# Patient Record
Sex: FEMALE | Race: WHITE | NOT HISPANIC OR LATINO | Employment: FULL TIME | URBAN - METROPOLITAN AREA
[De-identification: names, ages, dates, MRNs, and addresses within clinical notes are randomized per-mention and may not be internally consistent; named-entity substitution may affect disease eponyms.]

---

## 2018-01-29 ENCOUNTER — TRANSCRIBE ORDERS (OUTPATIENT)
Dept: ADMINISTRATIVE | Facility: HOSPITAL | Age: 48
End: 2018-01-29

## 2018-01-29 DIAGNOSIS — R10.9 STOMACH PAIN: Primary | ICD-10-CM

## 2018-02-02 ENCOUNTER — HOSPITAL ENCOUNTER (OUTPATIENT)
Dept: ULTRASOUND IMAGING | Facility: HOSPITAL | Age: 48
Discharge: HOME | End: 2018-02-02
Admitting: INTERNAL MEDICINE

## 2018-02-02 DIAGNOSIS — R10.9 STOMACH PAIN: ICD-10-CM

## 2018-02-02 PROCEDURE — 76700 US EXAM ABDOM COMPLETE: CPT

## 2018-02-09 ENCOUNTER — TRANSCRIBE ORDERS (OUTPATIENT)
Dept: ADMINISTRATIVE | Facility: HOSPITAL | Age: 48
End: 2018-02-09

## 2018-02-09 DIAGNOSIS — K76.89 LIVER NODULE: Primary | ICD-10-CM

## 2018-02-09 DIAGNOSIS — R10.9 RIGHT SIDED ABDOMINAL PAIN: Primary | ICD-10-CM

## 2018-02-14 ENCOUNTER — HOSPITAL ENCOUNTER (OUTPATIENT)
Dept: NUCLEAR MEDICINE | Facility: HOSPITAL | Age: 48
Discharge: HOME | End: 2018-02-14

## 2018-02-14 DIAGNOSIS — R10.9 RIGHT SIDED ABDOMINAL PAIN: ICD-10-CM

## 2018-02-14 PROCEDURE — 78227 HEPATOBIL SYST IMAGE W/DRUG: CPT

## 2018-02-14 PROCEDURE — A9537 TC99M MEBROFENIN: HCPCS | Performed by: INTERNAL MEDICINE

## 2018-02-14 PROCEDURE — 25010000002 SINCALIDE PER 5 MCG: Performed by: INTERNAL MEDICINE

## 2018-02-14 PROCEDURE — 0 TECHNETIUM TC 99M MEBROFENIN KIT: Performed by: INTERNAL MEDICINE

## 2018-02-14 RX ORDER — KIT FOR THE PREPARATION OF TECHNETIUM TC 99M MEBROFENIN 45 MG/10ML
1 INJECTION, POWDER, LYOPHILIZED, FOR SOLUTION INTRAVENOUS
Status: COMPLETED | OUTPATIENT
Start: 2018-02-14 | End: 2018-02-14

## 2018-02-14 RX ADMIN — SINCALIDE 1.1 MCG: 5 INJECTION, POWDER, LYOPHILIZED, FOR SOLUTION INTRAVENOUS at 10:19

## 2018-02-14 RX ADMIN — MEBROFENIN 1 DOSE: 45 INJECTION, POWDER, LYOPHILIZED, FOR SOLUTION INTRAVENOUS at 09:20

## 2018-02-20 RX ORDER — ALPRAZOLAM 0.25 MG/1
0.25 TABLET ORAL NIGHTLY PRN
COMMUNITY
Start: 2017-11-29 | End: 2020-05-26

## 2018-02-22 ENCOUNTER — OFFICE VISIT (OUTPATIENT)
Dept: SURGERY | Facility: CLINIC | Age: 48
End: 2018-02-22

## 2018-02-22 VITALS — WEIGHT: 118 LBS | OXYGEN SATURATION: 99 % | HEART RATE: 78 BPM | HEIGHT: 60 IN | BODY MASS INDEX: 23.16 KG/M2

## 2018-02-22 DIAGNOSIS — R10.11 RUQ PAIN: Primary | ICD-10-CM

## 2018-02-22 DIAGNOSIS — K82.8 BILIARY DYSKINESIA: ICD-10-CM

## 2018-02-22 PROCEDURE — 99243 OFF/OP CNSLTJ NEW/EST LOW 30: CPT | Performed by: SURGERY

## 2018-02-22 RX ORDER — CHOLECALCIFEROL (VITAMIN D3) 125 MCG
5 CAPSULE ORAL NIGHTLY
COMMUNITY

## 2018-02-22 RX ORDER — LEVOTHYROXINE AND LIOTHYRONINE 19; 4.5 UG/1; UG/1
30 TABLET ORAL DAILY
COMMUNITY
End: 2022-12-09

## 2018-02-22 RX ORDER — ERGOCALCIFEROL (VITAMIN D2) 10 MCG
400 TABLET ORAL DAILY
COMMUNITY
End: 2022-12-09

## 2018-02-22 NOTE — PROGRESS NOTES
SUMMARY (A/P):    47-year-old lady with typical symptoms of biliary colic and abnormal HIDA scan.  We discussed options of observation versus laparoscopic cholecystectomy.  In terms of the surgery I described the rationale for the procedure, the nature of the procedure, and the risks including but not limited to bleeding, infection, conversion to open procedure, postoperative bile leak, and the bowel function changes which can accompany cholecystectomy.  Given her options she would prefer to proceed with laparoscopic cholecystectomy and is scheduled for 3/9/2018.      CC:  Referred for consultation by Dr. Nathaniel Russell regarding abdominal pain    HPI:  39-year-old lady presents with 2 month history of intermittent at times moderately severe right upper quadrant abdominal pain radiating to the shoulder.  The pain has become more constant but waxes and wanes in severity.  Sometimes it seems worse with larger meals but sometimes symptoms occur first thing in the morning.  No significant associated nausea.    PHYSICAL EXAM:   Constitutional: Well-developed well-nourished, no acute distress   Heart rate 78   Weight (pounds) 118   BMI 23   Height (inches) 60  Eyes: Conjunctiva normal, sclera nonicteric  ENMT: Hearing grossly normal, oral mucosa moist  Neck: Supple, no palpable mass, normal thyroid, trachea midline  Respiratory: Clear to auscultation, normal inspiratory effort  Cardiovascular: Regular rate, no murmur, no carotid bruit, no peripheral edema, no jugular venous distention  Gastrointestinal: Soft, mild right upper quadrant tenderness to deep palpation with positive Hodgson sign, no palpable mass, no hepatosplenomegaly, negative for hernia, bowel sounds normal  Lymphatics (palpable nodes):  cervical-negative, axillary-negative  Skin:  Warm, dry, no rash on visualized skin surfaces  Musculoskeletal: Symmetric strength, normal gait  Psychiatric: Alert and oriented ×3, normal affect     ALLERGIES: reviewed, in  Epic    MEDICATIONS: reviewed, in Epic    PMH:    Anxiety/depression  Asthma  Hypothyroidism     PSH:     section ×3  Sinus surgery  Breast reduction    FAMILY HISTORY:    Negative for gallbladder disease  Negative for colorectal cancer    SOCIAL HISTORY:   Denies tobacco use  Occasional alcohol use    ROS:  No chest pain or shortness of air.  All other systems reviewed and negative other than presenting complaints.    RADIOLOGY/ENDOSCOPY:    -Gallbladder ultrasound 2018: Tiny hyperechoic nodule demonstrated within the liver, probable hemangioma.  Recommend repeating liver sonogram in 6 months.  -HIDA scan with Kinevac stimulation 2018:17% ejection fraction    ZOILA BARAHONA M.D.

## 2018-03-06 ENCOUNTER — APPOINTMENT (OUTPATIENT)
Dept: PREADMISSION TESTING | Facility: HOSPITAL | Age: 48
End: 2018-03-06

## 2018-03-13 ENCOUNTER — APPOINTMENT (OUTPATIENT)
Dept: PREADMISSION TESTING | Facility: HOSPITAL | Age: 48
End: 2018-03-13

## 2018-08-28 ENCOUNTER — HOSPITAL ENCOUNTER (OUTPATIENT)
Dept: ULTRASOUND IMAGING | Facility: HOSPITAL | Age: 48
Discharge: HOME | End: 2018-08-28
Admitting: INTERNAL MEDICINE

## 2018-08-28 DIAGNOSIS — K76.89 LIVER NODULE: ICD-10-CM

## 2018-08-28 PROCEDURE — 76700 US EXAM ABDOM COMPLETE: CPT

## 2018-10-12 ENCOUNTER — LAB (OUTPATIENT)
Dept: LAB | Facility: HOSPITAL | Age: 48
End: 2018-10-12

## 2018-10-12 ENCOUNTER — TRANSCRIBE ORDERS (OUTPATIENT)
Dept: ADMINISTRATIVE | Facility: HOSPITAL | Age: 48
End: 2018-10-12

## 2018-10-12 DIAGNOSIS — D64.0 CONGENITAL SIDEROBLASTIC ANEMIA (HCC): ICD-10-CM

## 2018-10-12 DIAGNOSIS — D64.0 CONGENITAL SIDEROBLASTIC ANEMIA (HCC): Primary | ICD-10-CM

## 2018-10-12 LAB
FOLATE SERPL-MCNC: >20 NG/ML (ref 4.78–24.2)
T3 SERPL-MCNC: 173.2 NG/DL (ref 80–200)
T4 FREE SERPL-MCNC: 2.2 NG/DL (ref 0.93–1.7)
TSH SERPL DL<=0.05 MIU/L-ACNC: <0.005 MIU/ML (ref 0.27–4.2)
VIT B12 BLD-MCNC: 1036 PG/ML (ref 211–946)

## 2018-10-12 PROCEDURE — 82746 ASSAY OF FOLIC ACID SERUM: CPT | Performed by: INTERNAL MEDICINE

## 2018-10-12 PROCEDURE — 84439 ASSAY OF FREE THYROXINE: CPT | Performed by: INTERNAL MEDICINE

## 2018-10-12 PROCEDURE — 84443 ASSAY THYROID STIM HORMONE: CPT | Performed by: INTERNAL MEDICINE

## 2018-10-12 PROCEDURE — 84480 ASSAY TRIIODOTHYRONINE (T3): CPT | Performed by: INTERNAL MEDICINE

## 2018-10-12 PROCEDURE — 82607 VITAMIN B-12: CPT | Performed by: INTERNAL MEDICINE

## 2018-10-12 PROCEDURE — 36415 COLL VENOUS BLD VENIPUNCTURE: CPT

## 2021-04-26 ENCOUNTER — TELEPHONE (OUTPATIENT)
Dept: URGENT CARE | Facility: CLINIC | Age: 51
End: 2021-04-26

## 2021-06-15 ENCOUNTER — LAB REQUISITION (OUTPATIENT)
Dept: LAB | Facility: HOSPITAL | Age: 51
End: 2021-06-15

## 2021-06-15 DIAGNOSIS — Z00.00 ENCOUNTER FOR GENERAL ADULT MEDICAL EXAMINATION WITHOUT ABNORMAL FINDINGS: ICD-10-CM

## 2021-06-15 PROCEDURE — 88305 TISSUE EXAM BY PATHOLOGIST: CPT | Performed by: SURGERY

## 2021-07-09 LAB
LAB AP CASE REPORT: NORMAL
PATH REPORT.FINAL DX SPEC: NORMAL
PATH REPORT.GROSS SPEC: NORMAL

## 2022-06-07 ENCOUNTER — ON CAMPUS - OUTPATIENT (AMBULATORY)
Dept: URBAN - METROPOLITAN AREA HOSPITAL 2 | Facility: HOSPITAL | Age: 52
End: 2022-06-07
Payer: COMMERCIAL

## 2022-06-07 VITALS
SYSTOLIC BLOOD PRESSURE: 129 MMHG | SYSTOLIC BLOOD PRESSURE: 137 MMHG | TEMPERATURE: 98 F | HEIGHT: 60 IN | DIASTOLIC BLOOD PRESSURE: 69 MMHG | HEART RATE: 111 BPM | HEART RATE: 116 BPM | SYSTOLIC BLOOD PRESSURE: 121 MMHG | DIASTOLIC BLOOD PRESSURE: 41 MMHG | HEART RATE: 88 BPM | RESPIRATION RATE: 15 BRPM | WEIGHT: 120 LBS | DIASTOLIC BLOOD PRESSURE: 80 MMHG | RESPIRATION RATE: 78 BRPM | HEART RATE: 89 BPM | OXYGEN SATURATION: 98 % | SYSTOLIC BLOOD PRESSURE: 117 MMHG | HEART RATE: 110 BPM | HEART RATE: 90 BPM | OXYGEN SATURATION: 100 % | SYSTOLIC BLOOD PRESSURE: 133 MMHG | SYSTOLIC BLOOD PRESSURE: 98 MMHG | OXYGEN SATURATION: 99 % | RESPIRATION RATE: 16 BRPM | HEART RATE: 74 BPM | DIASTOLIC BLOOD PRESSURE: 52 MMHG | DIASTOLIC BLOOD PRESSURE: 77 MMHG | DIASTOLIC BLOOD PRESSURE: 71 MMHG | SYSTOLIC BLOOD PRESSURE: 114 MMHG | SYSTOLIC BLOOD PRESSURE: 112 MMHG | HEART RATE: 121 BPM | SYSTOLIC BLOOD PRESSURE: 132 MMHG | DIASTOLIC BLOOD PRESSURE: 58 MMHG

## 2022-06-07 DIAGNOSIS — Z12.11 ENCOUNTER FOR SCREENING FOR MALIGNANT NEOPLASM OF COLON: ICD-10-CM

## 2022-06-07 DIAGNOSIS — K57.30 DIVERTICULOSIS OF LARGE INTESTINE WITHOUT PERFORATION OR ABS: ICD-10-CM

## 2022-06-07 PROCEDURE — 45378 DIAGNOSTIC COLONOSCOPY: CPT | Mod: 33 | Performed by: INTERNAL MEDICINE

## 2023-03-14 ENCOUNTER — TRANSCRIBE ORDERS (OUTPATIENT)
Dept: ADMINISTRATIVE | Facility: HOSPITAL | Age: 53
End: 2023-03-14
Payer: COMMERCIAL

## 2023-03-14 DIAGNOSIS — Z13.6 ENCOUNTER FOR SCREENING FOR VASCULAR DISEASE: Primary | ICD-10-CM

## 2023-03-21 ENCOUNTER — HOSPITAL ENCOUNTER (OUTPATIENT)
Dept: CARDIOLOGY | Facility: HOSPITAL | Age: 53
Discharge: HOME | End: 2023-03-21
Admitting: NURSE PRACTITIONER

## 2023-03-21 DIAGNOSIS — Z13.6 ENCOUNTER FOR SCREENING FOR VASCULAR DISEASE: ICD-10-CM

## 2023-03-21 LAB
BH CV XLRA MEAS - MID AO DIAM: 1.3 CM
BH CV XLRA MEAS - PAD LEFT ABI PT: 1.27
BH CV XLRA MEAS - PAD LEFT ARM: 107 MMHG
BH CV XLRA MEAS - PAD LEFT LEG PT: 138 MMHG
BH CV XLRA MEAS - PAD RIGHT ABI PT: 1.23
BH CV XLRA MEAS - PAD RIGHT ARM: 109 MMHG
BH CV XLRA MEAS - PAD RIGHT LEG PT: 134 MMHG
BH CV XLRA MEAS LEFT DIST CCA EDV: -37.9 CM/SEC
BH CV XLRA MEAS LEFT DIST CCA PSV: -97.5 CM/SEC
BH CV XLRA MEAS LEFT ICA/CCA RATIO: 0.9
BH CV XLRA MEAS LEFT MID CCA PSV: 98 CM/SEC
BH CV XLRA MEAS LEFT MID ICA PSV: 89 CM/SEC
BH CV XLRA MEAS LEFT PROX ICA EDV: -34.8 CM/SEC
BH CV XLRA MEAS LEFT PROX ICA PSV: -88.8 CM/SEC
BH CV XLRA MEAS RIGHT DIST CCA EDV: -24.2 CM/SEC
BH CV XLRA MEAS RIGHT DIST CCA PSV: -91.3 CM/SEC
BH CV XLRA MEAS RIGHT ICA/CCA RATIO: 0.9
BH CV XLRA MEAS RIGHT MID CCA PSV: 91 CM/SEC
BH CV XLRA MEAS RIGHT MID ICA PSV: 81 CM/SEC
BH CV XLRA MEAS RIGHT PROX ICA EDV: -28 CM/SEC
BH CV XLRA MEAS RIGHT PROX ICA PSV: -80.8 CM/SEC
MAXIMAL PREDICTED HEART RATE: 167 BPM
STRESS TARGET HR: 142 BPM

## 2023-03-21 PROCEDURE — 93799 UNLISTED CV SVC/PROCEDURE: CPT
